# Patient Record
Sex: FEMALE | Race: WHITE | NOT HISPANIC OR LATINO | Employment: OTHER | URBAN - METROPOLITAN AREA
[De-identification: names, ages, dates, MRNs, and addresses within clinical notes are randomized per-mention and may not be internally consistent; named-entity substitution may affect disease eponyms.]

---

## 2017-02-08 ENCOUNTER — CLINICAL SUPPORT (OUTPATIENT)
Dept: OBSTETRICS AND GYNECOLOGY | Facility: CLINIC | Age: 37
End: 2017-02-08
Payer: MEDICARE

## 2017-02-08 DIAGNOSIS — Z30.42 ENCOUNTER FOR DEPO-PROVERA CONTRACEPTION: Primary | ICD-10-CM

## 2017-02-08 PROCEDURE — 96372 THER/PROPH/DIAG INJ SC/IM: CPT | Mod: S$GLB,,, | Performed by: OBSTETRICS & GYNECOLOGY

## 2017-02-08 RX ADMIN — MEDROXYPROGESTERONE ACETATE 150 MG: 150 INJECTION, SUSPENSION INTRAMUSCULAR at 02:02

## 2017-02-08 NOTE — PROGRESS NOTES
Administered Depo Provera 150 mg/ml   1 ml IM inj in R upper quad gluteus  Patient Tolerated well.  Patient instructed to return on 5-2-17 for next injection.   Patient verbalized understanding.   Lot:[id number]  Exp: 08/2019

## 2017-05-02 ENCOUNTER — OFFICE VISIT (OUTPATIENT)
Dept: OBSTETRICS AND GYNECOLOGY | Facility: CLINIC | Age: 37
End: 2017-05-02
Payer: MEDICARE

## 2017-05-02 ENCOUNTER — CLINICAL SUPPORT (OUTPATIENT)
Dept: OBSTETRICS AND GYNECOLOGY | Facility: CLINIC | Age: 37
End: 2017-05-02
Payer: MEDICARE

## 2017-05-02 VITALS
DIASTOLIC BLOOD PRESSURE: 78 MMHG | HEIGHT: 66 IN | BODY MASS INDEX: 31.43 KG/M2 | SYSTOLIC BLOOD PRESSURE: 114 MMHG | WEIGHT: 195.56 LBS

## 2017-05-02 DIAGNOSIS — Z01.419 ROUTINE GYNECOLOGICAL EXAMINATION: Primary | ICD-10-CM

## 2017-05-02 PROCEDURE — 88175 CYTOPATH C/V AUTO FLUID REDO: CPT | Performed by: PATHOLOGY

## 2017-05-02 PROCEDURE — 87624 HPV HI-RISK TYP POOLED RSLT: CPT

## 2017-05-02 PROCEDURE — 96372 THER/PROPH/DIAG INJ SC/IM: CPT | Mod: S$GLB,,, | Performed by: OBSTETRICS & GYNECOLOGY

## 2017-05-02 PROCEDURE — 1160F RVW MEDS BY RX/DR IN RCRD: CPT | Mod: S$GLB,,, | Performed by: OBSTETRICS & GYNECOLOGY

## 2017-05-02 PROCEDURE — 99999 PR PBB SHADOW E&M-EST. PATIENT-LVL II: CPT | Mod: PBBFAC,,, | Performed by: OBSTETRICS & GYNECOLOGY

## 2017-05-02 PROCEDURE — G0101 CA SCREEN;PELVIC/BREAST EXAM: HCPCS | Mod: S$GLB,,, | Performed by: OBSTETRICS & GYNECOLOGY

## 2017-05-02 PROCEDURE — 88141 CYTOPATH C/V INTERPRET: CPT | Mod: ,,, | Performed by: PATHOLOGY

## 2017-05-02 RX ORDER — ZIPRASIDONE HYDROCHLORIDE 60 MG/1
CAPSULE ORAL
COMMUNITY
Start: 2017-02-09 | End: 2017-07-24

## 2017-05-02 RX ADMIN — MEDROXYPROGESTERONE ACETATE 150 MG: 150 INJECTION, SUSPENSION INTRAMUSCULAR at 10:05

## 2017-05-02 NOTE — PROGRESS NOTES
"GYNECOLOGY  :  Lyndsay Egan is a 36 y.o.    Here today for annual visit  Hx of CHALINO 1      Due  for a PAP   No complaints   On  Depo progesterone  injections ( received one this am)       Hx of : Ascus PAP   + + HPV High risk     Colposcopy  Pathology resulted =  FINAL PATHOLOGIC DIAGNOSIS  CERVIX, 5:00 AND 7:00, BIOPSY:  Squamous epithelium with low-grade cervical intraepithelial neoplasia and koilocytic changes (CHALINO 1)  Chronic active cervicitis  Transformation zone present    Past Medical History:   Diagnosis Date    Abnormal Pap smear     Anxiety disorder     Autistic disorder     Bipolar disorder     Herpes     Hypertension     Spine curvature, acquired      History reviewed. No pertinent surgical history.  Family History   Problem Relation Age of Onset    Prostate cancer Paternal Grandfather     Brain cancer Paternal Grandmother     Diabetes Maternal Grandmother     Skin cancer Maternal Grandfather     Hypertension Father     Gout Father     Cervical cancer Mother      borderline    Breast cancer Neg Hx     Colon cancer Neg Hx     Ovarian cancer Neg Hx      Social History   Substance Use Topics    Smoking status: Never Smoker    Smokeless tobacco: None    Alcohol use Yes      Comment: occasional     OB History    Para Term  AB SAB TAB Ectopic Multiple Living   2 2 2 0 0 0 0 0 0 2      # Outcome Date GA Lbr Omi/2nd Weight Sex Delivery Anes PTL Lv   2 Term 03/30/15 38w1d 03:18 / 00:18 2.499 kg (5 lb 8.2 oz) F Vag-Spont Local N Y   1 Term 10/24/13 37w0d  2.892 kg (6 lb 6 oz) M Vag-Spont EPI  Y      Birth Comments: BABY  GIVEN UP FOR ADOPTION          /78  Ht 5' 6" (1.676 m)  Wt 88.7 kg (195 lb 8.8 oz)  BMI 31.56 kg/m2      ROS  GENERAL: Denies significant weight gain or weight loss. Feeling well overall.  SKIN: Denies rash or lesions.  Normal skin turgor  HEAD: Denies head injury or headache.   NODES: Denies enlarged lymph nodes.   CHEST: Denies chest pain or " shortness of breath.   CARDIOVASCULAR: Denies palpitations or left sided chest pain.   ABDOMEN: No abdominal pain,no  diarrhea,constipation  nausea, vomiting or rectal bleeding.   URINARY: normal  Frequency,no  Dysuria or burning on urination.   REPRODUCTIVE: No abnormal bleeding.No vaginal discharge.   BREASTS: The patient sometimes performs breast self-examination and denies pain, lumps, or nipple discharge.   HEMATOLOGIC: No easy bruisability or excessive bleeding.   MUSCULOSKELETAL: Denies joint pain or swelling.   NEUROLOGIC: Denies syncope or weakness.   PSYCHIATRIC: Denies depression, anxiety or mood swings.    Physical Exam     Constitutional: She is oriented to person, place, and time. She appears well-developed and well-nourished. No distress.   HENT:   Head: Normocephalic.   NECK: Neck symmetric without masses or thyromegaly.  Cardiovascular: Normal rate and regular rhythm.   Pulmonary/Chest: Effort normal and breath sounds normal. No respiratory distress. She has no wheezes.   Breasts: Symmetrical, no skin changes or visible lesions. No palpable masses, nipple discharge or adenopathy bilaterally.  Abdominal: Soft not distended. Bowel sounds are normal. She exhibits   no masses . No tenderness to palpation.   Genitourinary: Pelvic exam was performed with patient supine.   External genitalia normal no lesions.Normal hair distribution   Adequate perineal body,normal urethral meatus   Vagina moist and well rugated without lesions, no vaginal  Discharge.   Cervix pink and without lesions. No bleeding. No significant cystocele or rectocele.  Bimanual exam showed uterus normal size, shape and position , mobile and nontender. Adnexa without masses or tenderness. Urethra and bladder normal  Extremities normal no cyanosis ,edema.         A/P Lyndsay Egan 36 y.o.     Dx=  1- Cervical dysplasia  (Ascus Pap + HPV )      colpo bx= CHALINO 1   2- s/p   3- contraception counseling     Plan:  Will follow PAP      Contraception = Continue on Depo progesterone injections         Lalo Frankel M.D.   OB/GYN

## 2017-05-02 NOTE — PROGRESS NOTES
Pt received 150mg IM Depo Provera to the R upper outer quad on 5/2/17. Pt tolerated well. Pt instructed to get next injection on 7/24/17. Pt verbalized understanding. Lot # [id number]   Exp date 11/19

## 2017-05-10 LAB
HPV16 DNA SPEC QL NAA+PROBE: NEGATIVE
HPV16+18+H RISK 12 DNA CVX-IMP: POSITIVE
HPV18 DNA SPEC QL NAA+PROBE: NEGATIVE

## 2017-05-28 ENCOUNTER — TELEPHONE (OUTPATIENT)
Dept: OBSTETRICS AND GYNECOLOGY | Facility: CLINIC | Age: 37
End: 2017-05-28

## 2017-05-29 NOTE — TELEPHONE ENCOUNTER
PAP is reviewed:    ASCUS PAP  HPV is positive     Needs to be scheduled for Colposcopy/ biopsy    Thanks     Lalo Frankel M.D.   OB/GYN    5/28/2017

## 2017-06-15 ENCOUNTER — TELEPHONE (OUTPATIENT)
Dept: OBSTETRICS AND GYNECOLOGY | Facility: CLINIC | Age: 37
End: 2017-06-15

## 2017-06-15 NOTE — TELEPHONE ENCOUNTER
----- Message from Nicole Farr sent at 6/15/2017  3:08 PM CDT -----  Contact: [phone]  Patient is returning  your call

## 2017-06-15 NOTE — TELEPHONE ENCOUNTER
Spoke with patient, scheduled colpo 7/24/2017 at 2:00 pm, sent out letter with appointment information, patient verbalized understanding

## 2017-07-24 ENCOUNTER — DOCUMENTATION ONLY (OUTPATIENT)
Dept: PHARMACY | Facility: CLINIC | Age: 37
End: 2017-07-24

## 2017-07-24 ENCOUNTER — OFFICE VISIT (OUTPATIENT)
Dept: OBSTETRICS AND GYNECOLOGY | Facility: CLINIC | Age: 37
End: 2017-07-24
Payer: MEDICARE

## 2017-07-24 VITALS
BODY MASS INDEX: 32.77 KG/M2 | DIASTOLIC BLOOD PRESSURE: 87 MMHG | WEIGHT: 203.06 LBS | SYSTOLIC BLOOD PRESSURE: 142 MMHG

## 2017-07-24 DIAGNOSIS — N87.9 CERVICAL DYSPLASIA: ICD-10-CM

## 2017-07-24 DIAGNOSIS — R13.19 CERVICAL DYSPHAGIA: Primary | ICD-10-CM

## 2017-07-24 PROCEDURE — 88305 TISSUE EXAM BY PATHOLOGIST: CPT

## 2017-07-24 PROCEDURE — 88305 TISSUE EXAM BY PATHOLOGIST: CPT | Mod: 26,,,

## 2017-07-24 PROCEDURE — 99999 PR PBB SHADOW E&M-EST. PATIENT-LVL III: CPT | Mod: PBBFAC,,, | Performed by: OBSTETRICS & GYNECOLOGY

## 2017-07-24 PROCEDURE — 57455 BIOPSY OF CERVIX W/SCOPE: CPT | Mod: S$GLB,,, | Performed by: OBSTETRICS & GYNECOLOGY

## 2017-07-24 PROCEDURE — 99499 UNLISTED E&M SERVICE: CPT | Mod: S$GLB,,, | Performed by: OBSTETRICS & GYNECOLOGY

## 2017-07-24 RX ORDER — MEDROXYPROGESTERONE ACETATE 150 MG/ML
150 INJECTION, SUSPENSION INTRAMUSCULAR
Qty: 1 ML | Refills: 3 | Status: SHIPPED | OUTPATIENT
Start: 2017-07-24 | End: 2018-01-21

## 2017-07-24 RX ORDER — ZIPRASIDONE HYDROCHLORIDE 80 MG/1
CAPSULE ORAL
COMMUNITY
Start: 2017-05-11

## 2017-07-24 RX ORDER — ESCITALOPRAM OXALATE 10 MG/1
TABLET ORAL
COMMUNITY
Start: 2017-05-11

## 2017-07-24 NOTE — PROGRESS NOTES
COLPOSCOPY:    DATE+7/24/2017  Physician:Lalo Frankel    Lyndsay Egan is a 37 y.o. female who presents for a colposcopy secondary to abnormal pap smear.      UPT is negative.    INDICATIONS: Her most recent papsmear showed: ASCUS with POSITIVE high risk HPV    She does have a history of abnormal pap smears.      PRE-COLPOSCOPY PROCEDURE COUNSELING:  Discussed the abnormal pap test findings, HPV, need for colposcopy and possible biopsies to determine a diagnosis and plan of care, treatments available, the minimal risks of bleeding and infection with a colposcopy, alternatives to colposcopy and she agrees to proceed.  Patient was given the opportunity to ask questions and verbal consent was obtained.        COLPOSCOPY EXAM:   TIME OUT PERFORMED.     Physical Exam    acetowhite lesion(s) noted at 6 o'clock, punctation noted at 6 o'clock, mosaicism noted at 6 o'clock and abnormal vessels noted at 12 o'clock    Biopsy was taken at 12 o'clock and at 6 o'clock . Two most significant lesions     ECC was performed.  Minimal blood loss.        The speculum was removed. The patient tolerated the procedure well.  There were no complications.      IMPRESSION:   Abnormal Pap 795.09    POST COLPOSCOPY COUNSELING:   Manage post colposcopy cramping with NSAIDs, Tylenol or Rx per [facility].  Avoid anything in vagina (intercourse, douching, tampons) one week after the procedure.  Expect a clumpy blackish vaginal discharge (Monsel's solution) for several days.  Report bleeding heavier than a period, worsening pain, fever > 101.0 F, or foul-smelling vaginal discharge.  HPV vaccine recommended according to FDA age guidelines.  Importance of follow-up stressed.    Counseling lasted approximately 15 minutes and all her questions were answered.    FOLLOW-UP:   Per bx results  Follow up in 2 weeks     Lalo Frankel M.D.   OB/GYN

## 2017-07-24 NOTE — PROGRESS NOTES
Pt received 150mg IM Depo Provera to the L upper outer quad on 07/24/17. Pt tolerated well. Pt instructed to get next injection on 10/15/2017. Pt verbalized understanding. Lot # [id number]   Exp date 12/2021

## 2017-08-06 ENCOUNTER — TELEPHONE (OUTPATIENT)
Dept: OBSTETRICS AND GYNECOLOGY | Facility: CLINIC | Age: 37
End: 2017-08-06

## 2017-08-07 NOTE — TELEPHONE ENCOUNTER
Pathology resulted:  No dysplasia seen  on ecto or endocervical samples     HPV effect is visible     Patient needs a PAP in one year     RTC as scheduled     Lalo Frankel M.D.   OB/GYN    8/6/2017

## 2017-08-09 NOTE — TELEPHONE ENCOUNTER
----- Message from Artemio Cheng sent at 8/9/2017  3:52 PM CDT -----  Contact: self/[phone]  She is returning the nurse's call.

## 2017-08-09 NOTE — TELEPHONE ENCOUNTER
Informed patient of results. Patient voiced understanding. Notified patient to call office if there were any further questions.